# Patient Record
Sex: MALE | Race: WHITE | NOT HISPANIC OR LATINO | Employment: UNEMPLOYED | URBAN - NONMETROPOLITAN AREA
[De-identification: names, ages, dates, MRNs, and addresses within clinical notes are randomized per-mention and may not be internally consistent; named-entity substitution may affect disease eponyms.]

---

## 2018-04-04 ENCOUNTER — TELEPHONE (OUTPATIENT)
Dept: CARDIOLOGY | Facility: CLINIC | Age: 53
End: 2018-04-04

## 2018-04-04 NOTE — TELEPHONE ENCOUNTER
Spoke with patient and he states that he is scheduled with Dr. Tripp surgeon for cervical fixation. They are wanting our office to send a clearance of what we are needing to their office per Pt report. Generally the surgeon will fax a request to our office if they are needing a clearance. patient gave me a fax # [phone] phone # [phone]. I informed him that until we see him in office as he is a new patient we cannot proceed with a clearance.     ----- Message from Angela Liang sent at 4/4/2018 12:43 PM EDT -----   Kolby left a voicemail and said that he spoke with his surgeon who needs clearance on him and they told him they will not take a verbal from him to fax a clearance request to our office, to contact him at [phone] and he will give you the information on how to contact his surgeon for clearance procedure for their office.

## 2018-04-11 ENCOUNTER — OFFICE VISIT (OUTPATIENT)
Dept: CARDIOLOGY | Facility: CLINIC | Age: 53
End: 2018-04-11

## 2018-04-11 VITALS
SYSTOLIC BLOOD PRESSURE: 123 MMHG | DIASTOLIC BLOOD PRESSURE: 78 MMHG | WEIGHT: 270 LBS | BODY MASS INDEX: 38.65 KG/M2 | OXYGEN SATURATION: 98 % | HEART RATE: 69 BPM | HEIGHT: 70 IN

## 2018-04-11 DIAGNOSIS — E78.2 MIXED HYPERLIPIDEMIA: ICD-10-CM

## 2018-04-11 DIAGNOSIS — I10 ESSENTIAL HYPERTENSION: Primary | ICD-10-CM

## 2018-04-11 DIAGNOSIS — R01.1 SYSTOLIC EJECTION MURMUR: ICD-10-CM

## 2018-04-11 DIAGNOSIS — R07.9 CHEST PAIN, UNSPECIFIED TYPE: ICD-10-CM

## 2018-04-11 DIAGNOSIS — Z01.818 PREOPERATIVE CLEARANCE: ICD-10-CM

## 2018-04-11 DIAGNOSIS — R00.2 PALPITATIONS: ICD-10-CM

## 2018-04-11 PROCEDURE — 93000 ELECTROCARDIOGRAM COMPLETE: CPT | Performed by: INTERNAL MEDICINE

## 2018-04-11 PROCEDURE — 99204 OFFICE O/P NEW MOD 45 MIN: CPT | Performed by: INTERNAL MEDICINE

## 2018-04-11 RX ORDER — VALSARTAN AND HYDROCHLOROTHIAZIDE 160; 12.5 MG/1; MG/1
1 TABLET, FILM COATED ORAL DAILY
COMMUNITY

## 2018-04-11 NOTE — PROGRESS NOTES
Subjective   Kolby Roberts is a 52 y.o. male     Chief Complaint   Patient presents with   • Palpitations   • Medical Clearance     cervical fixation        PROBLEM LIST:     1. Palpitations   2. Medical clearance   3. Hypertension       Specialty Problems     None            HPI:  Mr. Kolby Roberts is a 52-year-old white male patient of Dr. Jinny Gonzalez seen today in consultation for preoperative risk assessment.    The patient is anticipating cervical disc surgery at [facility] in [place].  As part of his preprocedural intake he describes that he has palpitations.  Because of those symptoms the patient was requested to obtain cardiovascular clearance.    Mr. Roberts has had, for more than one year, short-lived palpitations.  He describes a rapid regular heartbeat which lasts less than 1 minute, and which causes no lightheadedness, dizziness, chest pain, or shortness of breath.  There are no obvious precipitating factors for the symptoms.  Palpitations are rather infrequent occurring, on average, once every one to 2 weeks and have not been increasing in frequency, intensity, or duration over time.    The patient denies any type of chest discomfort.  He relates orthopnea, PND, lower extremity edema.  He doesn't claudicate and he describes no symptoms of cerebral ischemia.  The patient currently functions at class II levels of activity is limited only by his back and leg pain.            CURRENT MEDICATION:    Current Outpatient Prescriptions   Medication Sig Dispense Refill   • valsartan-hydrochlorothiazide (DIOVAN-HCT) 160-12.5 MG per tablet Take 1 tablet by mouth Daily.       No current facility-administered medications for this visit.        ALLERGIES:    Review of patient's allergies indicates no known allergies.    PAST MEDICAL HISTORY:    Past Medical History:   Diagnosis Date   • Asthma    • Cervical disc disorder    • Hyperlipidemia    • Hypertension    • Seizure     in esophogous    • Sleep apnea     • Systolic ejection murmur 4/11/2018       SURGICAL HISTORY:    Past Surgical History:   Procedure Laterality Date   • REPAIR ANKLE LIGAMENT     • RHINOPLASTY         SOCIAL HISTORY:    Social History     Social History   • Marital status: Single     Spouse name: N/A   • Number of children: N/A   • Years of education: N/A     Occupational History   • Not on file.     Social History Main Topics   • Smoking status: Never Smoker   • Smokeless tobacco: Never Used   • Alcohol use No   • Drug use: No   • Sexual activity: Defer     Other Topics Concern   • Not on file     Social History Narrative   • No narrative on file       FAMILY HISTORY:    Family History   Problem Relation Age of Onset   • Arrhythmia Mother      AFIB   • Hypertension Mother    • Hypertension Sister        Review of Systems   Constitutional: Positive for activity change (limited due to chronic back pain ), diaphoresis (occasional nocturnal sweating ) and fatigue (anergy ). Negative for chills and fever.   HENT: Positive for trouble swallowing (occasional throat spasms ). Negative for congestion, dental problem, drooling, ear discharge, ear pain, facial swelling, hearing loss, mouth sores, nosebleeds, postnasal drip, rhinorrhea, sinus pain, sinus pressure, sneezing, sore throat, tinnitus and voice change.    Eyes: Positive for visual disturbance (wears glasses).   Respiratory: Positive for shortness of breath (with back pain and over exertion ). Negative for apnea, cough, choking, chest tightness, wheezing and stridor.    Cardiovascular: Positive for chest pain (rare, noticeable after meals ) and palpitations (intermittent, fluttering ). Negative for leg swelling.   Gastrointestinal: Negative for abdominal pain, blood in stool (no melena, no hematuria, no hematemesis, no hematochezia ), constipation, diarrhea, nausea and vomiting.   Endocrine: Negative for cold intolerance and heat intolerance.   Genitourinary: Negative for difficulty urinating,  "frequency, hematuria and urgency.   Musculoskeletal: Positive for arthralgias, back pain, neck pain and neck stiffness. Negative for gait problem, joint swelling and myalgias.   Skin: Negative for color change, pallor, rash and wound.   Allergic/Immunologic: Negative for environmental allergies, food allergies and immunocompromised state.   Neurological: Positive for seizures. Negative for dizziness, tremors, syncope, facial asymmetry (no stroke like symptoms), speech difficulty, weakness, light-headedness, numbness and headaches.   Hematological: Negative for adenopathy. Does not bruise/bleed easily.   Psychiatric/Behavioral: Negative.        VISIT VITALS:  Vitals:    04/11/18 1108   BP: 123/78   BP Location: Left arm   Patient Position: Sitting   Pulse: 69   SpO2: 98%   Weight: 122 kg (270 lb)   Height: 177.8 cm (70\")      /78 (BP Location: Left arm, Patient Position: Sitting)   Pulse 69   Ht 177.8 cm (70\")   Wt 122 kg (270 lb)   SpO2 98%   BMI 38.74 kg/m²     RECENT LABS:    Objective       Physical Exam   Constitutional: He is oriented to person, place, and time. He appears well-developed and well-nourished. No distress.   HENT:   Head: Normocephalic and atraumatic.   Eyes: Conjunctivae, EOM and lids are normal. Pupils are equal, round, and reactive to light. Lids are everted and swept, no foreign bodies found.   Negative ptosis  Negative lid lag   Negative arcus senilis      Neck: Normal range of motion. Neck supple. Normal carotid pulses, no hepatojugular reflux and no JVD present. Carotid bruit is not present. No tracheal deviation present. No thyroid mass and no thyromegaly present.   Cardiovascular: Normal rate, regular rhythm, S1 normal, S2 normal and intact distal pulses.  Exam reveals gallop and S4 (loud ).    Murmur heard.   Systolic (1-2/6 STERLING RUSB) murmur is present   Pulmonary/Chest: Effort normal and breath sounds normal. No respiratory distress. He has no decreased breath sounds. He has " no wheezes. He has no rhonchi. He has no rales. He exhibits no tenderness.   Abdominal: Soft. Bowel sounds are normal. He exhibits no distension, no abdominal bruit and no mass. There is no tenderness. There is no rebound and no guarding.   Negative organomegaly     Musculoskeletal: Normal range of motion. He exhibits no edema, tenderness or deformity.   Lymphadenopathy:     He has no cervical adenopathy.     He has no axillary adenopathy.   Neurological: He is alert and oriented to person, place, and time.   Skin: Skin is warm and dry. No rash noted. No erythema. No pallor.   Psychiatric: He has a normal mood and affect. His speech is normal and behavior is normal. Judgment and thought content normal. Cognition and memory are normal.   Nursing note and vitals reviewed.        ECG 12 Lead  Date/Time: 4/11/2018 11:17 AM  Performed by: SHAHID JOHNSON  Authorized by: SHAHID JOHNSON   Rhythm: sinus rhythm  Clinical impression: normal ECG  Comments: EPR              Assessment/Plan    #1.  Infrequent palpitations which are not accompanied by chest pain, dyspnea, or neurologic symptoms.  Symptoms have been stable for at least a year.  I think the patient is at low risk for perioperative dysrhythmic events and no believe that further evaluation of rhythm is indicated at this time.    #2.  The patient has a systolic ejection murmur as well as a possible ejection click.  I would like to perform echocardiography to assess LV systolic and diastolic performance preoperatively as well as to assess valve anatomy and physiology.    #3.  As the patient states he is unable to walk adequately on a treadmill and we will need to perform pharmacologic stress testing.  As the patient is at low risk from an epidemiologic standpoint is having a non-high-risk surgical procedure, I would reserve any recommendation for further evaluation for high risk stress test findings.    #4.  We will schedule studies as soon as possible, and relay  information to both the patient and his physicians in order to minimize the patient's visits here given the distance from his residence.  Mr. Gaston will follow-up with his surgeon in [place] and with Dr. Gonzalez further instructions, and in our office on a when necessary basis for symptoms or test findings.           Diagnosis Plan   1. Essential hypertension     2. Mixed hyperlipidemia     3. Chest pain, unspecified type     4. Systolic ejection murmur     5. Palpitations         No Follow-up on file.              Patient's Body mass index is 38.74 kg/m². BMI is above normal parameters. Follow-up plan includes:  educational material.       Tejal Buitrago MA  Scribed for Dr. Abilio Mcdermott by YOUSIF Garvey April 11, 2018 11:52 AM             Electronically signed by:            This note is dictated utilizing voice recognition software.  Although this record has been proof read, transcriptional errors may still be present. If questions occur regarding the content of this record please do not hesitate to call our office.

## 2018-04-18 ENCOUNTER — APPOINTMENT (OUTPATIENT)
Dept: CARDIOLOGY | Facility: HOSPITAL | Age: 53
End: 2018-04-18
Attending: INTERNAL MEDICINE

## 2018-04-24 ENCOUNTER — APPOINTMENT (OUTPATIENT)
Dept: CARDIOLOGY | Facility: HOSPITAL | Age: 53
End: 2018-04-24
Attending: INTERNAL MEDICINE

## 2018-04-30 ENCOUNTER — HOSPITAL ENCOUNTER (OUTPATIENT)
Dept: CARDIOLOGY | Facility: HOSPITAL | Age: 53
Discharge: HOME | End: 2018-04-30
Attending: INTERNAL MEDICINE

## 2018-04-30 ENCOUNTER — OUTSIDE FACILITY SERVICE (OUTPATIENT)
Dept: CARDIOLOGY | Facility: CLINIC | Age: 53
End: 2018-04-30

## 2018-04-30 LAB
MAXIMAL PREDICTED HEART RATE: 170 BPM
MAXIMAL PREDICTED HEART RATE: 170 BPM
STRESS TARGET HR: 145 BPM
STRESS TARGET HR: 145 BPM

## 2018-04-30 PROCEDURE — 78452 HT MUSCLE IMAGE SPECT MULT: CPT | Performed by: INTERNAL MEDICINE

## 2018-04-30 PROCEDURE — 93306 TTE W/DOPPLER COMPLETE: CPT | Performed by: INTERNAL MEDICINE

## 2018-04-30 PROCEDURE — 0 TECHNETIUM SESTAMIBI: Performed by: INTERNAL MEDICINE

## 2018-04-30 PROCEDURE — 93017 CV STRESS TEST TRACING ONLY: CPT

## 2018-04-30 PROCEDURE — 78452 HT MUSCLE IMAGE SPECT MULT: CPT

## 2018-04-30 PROCEDURE — 25010000002 REGADENOSON 0.4 MG/5ML SOLUTION: Performed by: INTERNAL MEDICINE

## 2018-04-30 PROCEDURE — 93018 CV STRESS TEST I&R ONLY: CPT | Performed by: INTERNAL MEDICINE

## 2018-04-30 PROCEDURE — 93306 TTE W/DOPPLER COMPLETE: CPT

## 2018-04-30 PROCEDURE — A9500 TC99M SESTAMIBI: HCPCS | Performed by: INTERNAL MEDICINE

## 2018-04-30 RX ADMIN — REGADENOSON 0.4 MG: 0.08 INJECTION, SOLUTION INTRAVENOUS at 14:15

## 2018-04-30 RX ADMIN — TECHNETIUM TC 99M SESTAMIBI 1 DOSE: 1 INJECTION INTRAVENOUS at 14:15

## 2018-05-02 ENCOUNTER — TELEPHONE (OUTPATIENT)
Dept: CARDIOLOGY | Facility: CLINIC | Age: 53
End: 2018-05-02

## 2018-05-03 ENCOUNTER — APPOINTMENT (OUTPATIENT)
Dept: CARDIOLOGY | Facility: HOSPITAL | Age: 53
End: 2018-05-03
Attending: INTERNAL MEDICINE

## 2018-05-03 ENCOUNTER — TELEPHONE (OUTPATIENT)
Dept: CARDIOLOGY | Facility: CLINIC | Age: 53
End: 2018-05-03

## 2018-05-03 NOTE — TELEPHONE ENCOUNTER
Attempted to call patient and he is not accepting calls at this time. I attempted to call sister # with no answer and left message to return call.     ----- Message from Eugenia May LPN sent at 5/3/2018  1:48 PM EDT -----  Contact: PATIENT      ----- Message -----  From: Yara Loyola  Sent: 5/3/2018   1:40 PM  To: [facility]    THE PATIENT CALLED AND STATES SOMEONE CALLED HIM YESTERDAY WITH TESTING RESULTS.  HE CAN BE REACHED [phone].  THANKS

## 2018-05-03 NOTE — TELEPHONE ENCOUNTER
patient aware of testing and to call our office to schedule a 2-3 month testing f/u.     ----- Message from Maribel Feliz sent at 5/3/2018  3:06 PM EDT -----  Contact: ESTEPHANIA FELTON IS REQUESTING A NURSE TO CALL ON THE RESULTS OF STRESS AND ECHO.  THE NURSE THAT CALLED EARLIER CALLED THE SISTER WHO IS THE EMERGENCY CONTACT. PLEASE CALL [phone]

## 2019-10-09 ENCOUNTER — OFFICE VISIT (OUTPATIENT)
Dept: UROLOGY | Facility: CLINIC | Age: 54
End: 2019-10-09

## 2019-10-09 VITALS — BODY MASS INDEX: 41.95 KG/M2 | HEIGHT: 70 IN | WEIGHT: 293 LBS

## 2019-10-09 DIAGNOSIS — R35.1 BENIGN PROSTATIC HYPERPLASIA WITH NOCTURIA: ICD-10-CM

## 2019-10-09 DIAGNOSIS — N40.1 BENIGN PROSTATIC HYPERPLASIA WITH NOCTURIA: ICD-10-CM

## 2019-10-09 DIAGNOSIS — R31.29 MICROSCOPIC HEMATURIA: Primary | ICD-10-CM

## 2019-10-09 DIAGNOSIS — R31.0 GROSS HEMATURIA: ICD-10-CM

## 2019-10-09 LAB
BILIRUB BLD-MCNC: NEGATIVE MG/DL
CLARITY, POC: CLEAR
COLOR UR: YELLOW
GLUCOSE UR STRIP-MCNC: NEGATIVE MG/DL
KETONES UR QL: NEGATIVE
LEUKOCYTE EST, POC: NEGATIVE
NITRITE UR-MCNC: NEGATIVE MG/ML
PH UR: 6.5 [PH] (ref 5–8)
PROT UR STRIP-MCNC: NEGATIVE MG/DL
RBC # UR STRIP: ABNORMAL /UL
SP GR UR: 1.01 (ref 1–1.03)
UROBILINOGEN UR QL: NORMAL

## 2019-10-09 PROCEDURE — 84153 ASSAY OF PSA TOTAL: CPT | Performed by: UROLOGY

## 2019-10-09 PROCEDURE — 81003 URINALYSIS AUTO W/O SCOPE: CPT | Performed by: UROLOGY

## 2019-10-09 PROCEDURE — 36415 COLL VENOUS BLD VENIPUNCTURE: CPT | Performed by: UROLOGY

## 2019-10-09 PROCEDURE — 99203 OFFICE O/P NEW LOW 30 MIN: CPT | Performed by: UROLOGY

## 2019-10-09 RX ORDER — AMPICILLIN TRIHYDRATE 250 MG
500 CAPSULE ORAL DAILY
COMMUNITY

## 2019-10-09 RX ORDER — ECHINACEA 400 MG
CAPSULE ORAL
COMMUNITY

## 2019-10-09 RX ORDER — LOSARTAN POTASSIUM AND HYDROCHLOROTHIAZIDE 12.5; 5 MG/1; MG/1
1 TABLET ORAL DAILY
COMMUNITY
Start: 2018-08-03

## 2019-10-09 RX ORDER — BIOTIN 5 MG
TABLET ORAL
COMMUNITY

## 2019-10-09 RX ORDER — ATORVASTATIN CALCIUM 20 MG/1
TABLET, FILM COATED ORAL
COMMUNITY
Start: 2019-09-27

## 2019-10-09 NOTE — PROGRESS NOTES
Chief Complaint:          [facility] with hx of gross hematuria    HPI:   54 y.o. male.  Pt has had kidney stones in the past.  He did past the stone about 6 years ago.  HPI      Past Medical History:        Past Medical History:   Diagnosis Date   • Asthma    • Cervical disc disorder    • Hyperlipidemia    • Hypertension    • Seizure (CMS/HCC)     in esophogous    • Sleep apnea    • Systolic ejection murmur 4/11/2018         Current Meds:     Current Outpatient Medications   Medication Sig Dispense Refill   • atorvastatin (LIPITOR) 20 MG tablet      • Cetirizine HCl 10 MG capsule Take 1 tablet by mouth Daily.     • Cinnamon 500 MG capsule Take 500 mg by mouth Daily.     • Flaxseed, Linseed, (FLAXSEED OIL) 1000 MG capsule Take  by mouth.     • Glucosamine-Chondroitin--200-150 MG tablet Take  by mouth.     • Krill Oil 1000 MG capsule Take  by mouth.     • losartan-hydrochlorothiazide (HYZAAR) 50-12.5 MG per tablet Take 1 tablet by mouth Daily.     • Milk Thistle 1000 MG capsule Take  by mouth.     • Misc Natural Products (PUMPKIN SEED OIL PO) Take  by mouth.     • Saw Palmetto-Pumpkin Seed Oil 160-100 MG capsule Take  by mouth.     • vitamin E 100 UNIT capsule Take 100 Units by mouth Daily.     • valsartan-hydrochlorothiazide (DIOVAN-HCT) 160-12.5 MG per tablet Take 1 tablet by mouth Daily.       No current facility-administered medications for this visit.         Allergies:      No Known Allergies     Past Surgical History:     Past Surgical History:   Procedure Laterality Date   • REPAIR ANKLE LIGAMENT     • RHINOPLASTY           Social History:     Social History     Socioeconomic History   • Marital status: Single     Spouse name: Not on file   • Number of children: Not on file   • Years of education: Not on file   • Highest education level: Not on file   Tobacco Use   • Smoking status: Never Smoker   • Smokeless tobacco: Never Used   Substance and Sexual Activity   • Alcohol use:  No   • Drug use: No   • Sexual activity: Defer       Family History:     Family History   Problem Relation Age of Onset   • Arrhythmia Mother         AFIB   • Hypertension Mother    • Hypertension Sister        Review of Systems:     Review of Systems   Constitutional: Negative for chills, fatigue and fever.   HENT: Negative for congestion and sinus pressure.    Respiratory: Negative for shortness of breath.    Cardiovascular: Negative for chest pain.   Gastrointestinal: Negative for abdominal pain, constipation, diarrhea, nausea and vomiting.   Genitourinary: Negative for frequency and urgency.   Musculoskeletal: Negative for back pain and neck pain.   Neurological: Negative for dizziness and headaches.   Hematological: Does not bruise/bleed easily.   Psychiatric/Behavioral: The patient is not nervous/anxious.              I have reviewed the follow portions of the patient's history and confirmed they are accurate today:  allergies, current medications, past family history, past medical history, past social history, past surgical history, problem list and ROS  Physical Exam:     Physical Exam   Constitutional: He is oriented to person, place, and time.   HENT:   Head: Normocephalic and atraumatic.   Right Ear: External ear normal.   Left Ear: External ear normal.   Nose: Nose normal.   Mouth/Throat: Oropharynx is clear and moist.   Eyes: Conjunctivae and EOM are normal. Pupils are equal, round, and reactive to light.   Neck: Normal range of motion. Neck supple. No thyromegaly present.   Cardiovascular: Normal rate, regular rhythm, normal heart sounds and intact distal pulses.   No murmur heard.  Pulmonary/Chest: Effort normal and breath sounds normal. No respiratory distress. He has no wheezes. He has no rales. He exhibits no tenderness.   Abdominal: Soft. Bowel sounds are normal. He exhibits no distension and no mass. There is no tenderness. No hernia.   Genitourinary: Rectum normal, prostate normal and penis  "normal.   Musculoskeletal: Normal range of motion. He exhibits no edema or tenderness.   Lymphadenopathy:     He has no cervical adenopathy.   Neurological: He is alert and oriented to person, place, and time. No cranial nerve deficit. He exhibits normal muscle tone. Coordination normal.   Skin: Skin is warm. No rash noted.   Psychiatric: He has a normal mood and affect. His behavior is normal. Judgment and thought content normal.   Nursing note and vitals reviewed.      Ht 177.8 cm (70\")   Wt 133 kg (293 lb)   BMI 42.04 kg/m²    Procedure:         Assessment:     Encounter Diagnoses   Name Primary?   • Microscopic hematuria Yes   • Gross hematuria        Orders Placed This Encounter   Procedures   • POC Urinalysis Dipstick, Automated       Patient reports that he is not currently experiencing any symptoms of urinary incontinence.      Plan:   Will order ct scan renal mass protocol and cystoscopy.  Will order psa.    Patient's Body mass index is 42.04 kg/m². BMI is above normal parameters. Recommendations include: educational material.      Smoking Cessation Counseling:  Never a smoker.  Patient does not currently use any tobacco products.     Counseling was given to patient for the following topics impressions as follows: gross hematuria and b. The interim medical history and current results were reviewed.  A treatment plan with follow-up was made for Microscopic hematuria [R31.29].   This document has been electronically signed by Zoltan Myers MD October 9, 2019 3:40 PM      "

## 2019-10-10 LAB — PSA SERPL-MCNC: 0.33 NG/ML (ref 0–4)

## 2019-10-24 ENCOUNTER — TELEPHONE (OUTPATIENT)
Dept: UROLOGY | Facility: CLINIC | Age: 54
End: 2019-10-24

## 2019-10-24 NOTE — TELEPHONE ENCOUNTER
Patient called to check on when his ct scan was going to be scheduled. I did not see an order in the chart for one.  When you put the order in I will get try to get it approved.

## 2019-11-05 ENCOUNTER — APPOINTMENT (OUTPATIENT)
Dept: CT IMAGING | Facility: HOSPITAL | Age: 54
End: 2019-11-05

## 2019-11-08 ENCOUNTER — HOSPITAL ENCOUNTER (OUTPATIENT)
Dept: CT IMAGING | Facility: HOSPITAL | Age: 54
Discharge: HOME | End: 2019-11-08
Admitting: UROLOGY

## 2019-11-08 DIAGNOSIS — R31.29 MICROSCOPIC HEMATURIA: ICD-10-CM

## 2019-11-08 PROCEDURE — 0 IOVERSOL 68 % SOLUTION: Performed by: UROLOGY

## 2019-11-08 PROCEDURE — 74178 CT ABD&PLV WO CNTR FLWD CNTR: CPT | Performed by: RADIOLOGY

## 2019-11-08 PROCEDURE — 74178 CT ABD&PLV WO CNTR FLWD CNTR: CPT

## 2019-11-08 RX ADMIN — IOVERSOL 100 ML: 678 INJECTION INTRA-ARTERIAL; INTRAVENOUS at 14:03

## 2019-11-20 ENCOUNTER — PROCEDURE VISIT (OUTPATIENT)
Dept: UROLOGY | Facility: CLINIC | Age: 54
End: 2019-11-20

## 2019-11-20 VITALS — BODY MASS INDEX: 42.09 KG/M2 | WEIGHT: 294 LBS | HEIGHT: 70 IN

## 2019-11-20 DIAGNOSIS — R33.9 INCOMPLETE BLADDER EMPTYING: ICD-10-CM

## 2019-11-20 DIAGNOSIS — R31.0 GROSS HEMATURIA: Primary | ICD-10-CM

## 2019-11-20 DIAGNOSIS — N40.0 BENIGN PROSTATIC HYPERPLASIA WITHOUT LOWER URINARY TRACT SYMPTOMS: ICD-10-CM

## 2019-11-20 PROCEDURE — 52000 CYSTOURETHROSCOPY: CPT | Performed by: UROLOGY

## 2019-11-20 PROCEDURE — 51798 US URINE CAPACITY MEASURE: CPT | Performed by: UROLOGY

## 2019-11-20 NOTE — PROGRESS NOTES
Chief Complaint:          Intermittent gross hematuria    HPI:   54 y.o. male.  Pt's Ct scan was negative for  pathology.  HPI      Past Medical History:        Past Medical History:   Diagnosis Date   • Asthma    • Cervical disc disorder    • Hyperlipidemia    • Hypertension    • Seizure (CMS/HCC)     in esophogous    • Sleep apnea    • Systolic ejection murmur 4/11/2018         Current Meds:     Current Outpatient Medications   Medication Sig Dispense Refill   • atorvastatin (LIPITOR) 20 MG tablet      • Cetirizine HCl 10 MG capsule Take 1 tablet by mouth Daily.     • Cinnamon 500 MG capsule Take 500 mg by mouth Daily.     • Flaxseed, Linseed, (FLAXSEED OIL) 1000 MG capsule Take  by mouth.     • Glucosamine-Chondroitin--200-150 MG tablet Take  by mouth.     • Krill Oil 1000 MG capsule Take  by mouth.     • losartan-hydrochlorothiazide (HYZAAR) 50-12.5 MG per tablet Take 1 tablet by mouth Daily.     • Milk Thistle 1000 MG capsule Take  by mouth.     • Misc Natural Products (PUMPKIN SEED OIL PO) Take  by mouth.     • Saw Palmetto-Pumpkin Seed Oil 160-100 MG capsule Take  by mouth.     • valsartan-hydrochlorothiazide (DIOVAN-HCT) 160-12.5 MG per tablet Take 1 tablet by mouth Daily.     • vitamin E 100 UNIT capsule Take 100 Units by mouth Daily.       No current facility-administered medications for this visit.         Allergies:      No Known Allergies     Past Surgical History:     Past Surgical History:   Procedure Laterality Date   • REPAIR ANKLE LIGAMENT     • RHINOPLASTY           Social History:     Social History     Socioeconomic History   • Marital status: Single     Spouse name: Not on file   • Number of children: Not on file   • Years of education: Not on file   • Highest education level: Not on file   Tobacco Use   • Smoking status: Never Smoker   • Smokeless tobacco: Never Used   Substance and Sexual Activity   • Alcohol use: No   • Drug use: No   • Sexual activity: Defer       Family History:  "    Family History   Problem Relation Age of Onset   • Arrhythmia Mother         AFIB   • Hypertension Mother    • Hypertension Sister        Review of Systems:     Review of Systems   Constitutional: Negative for fatigue.   HENT: Negative for sinus pressure and sinus pain.    Eyes: Negative for pain and redness.   Respiratory: Negative for chest tightness.    Cardiovascular: Negative for chest pain.   Gastrointestinal: Negative for abdominal pain, constipation and diarrhea.   Endocrine: Negative for heat intolerance.   Genitourinary: Positive for hematuria. Negative for dysuria and frequency.   Musculoskeletal: Negative for back pain.   Skin: Negative for rash.   Allergic/Immunologic: Negative for food allergies.   Neurological: Negative for headaches.   Hematological: Does not bruise/bleed easily.   Psychiatric/Behavioral: The patient is not nervous/anxious.          Physical Exam:     Physical Exam    Ht 177.8 cm (70\")   Wt 133 kg (294 lb)   BMI 42.18 kg/m²    Procedure:   Procedure: Cystoscopy Male    Indication: hematuria    Urinalysis Performed Today:  Negative for Infection    Informed Consent Obtained    Sterile prep performed in usual fashion    11 cc of topical lidocaine inserted urethrally for 10 minutes    Flexible cystoscope inserted and bladder examined    Findings: The urethra was normal.  Prostatic urethra was normal for age.  The bladder was without bladder tumors or bladder stones.  Normal ureteral orifices    Additional Procedure with Cystoscopy: none      .      Assessment:   No diagnosis found.    No orders of the defined types were placed in this encounter.      Patient reports that he is not currently experiencing any symptoms of urinary incontinence.      Plan:   The patient cystoscopy was negative and a CT scan was negative.  His PSA was normal at 0.33  Will see pt back in a year and get psa prior and check ua.       This document has been electronically signed by Zoltan Myers MD " November 20, 2019 1:07 PM